# Patient Record
Sex: FEMALE | NOT HISPANIC OR LATINO
[De-identification: names, ages, dates, MRNs, and addresses within clinical notes are randomized per-mention and may not be internally consistent; named-entity substitution may affect disease eponyms.]

---

## 2019-05-29 PROBLEM — Z00.00 ENCOUNTER FOR PREVENTIVE HEALTH EXAMINATION: Status: ACTIVE | Noted: 2019-05-29

## 2019-07-09 ENCOUNTER — APPOINTMENT (OUTPATIENT)
Dept: OTOLARYNGOLOGY | Facility: CLINIC | Age: 79
End: 2019-07-09
Payer: MEDICARE

## 2019-07-09 VITALS — WEIGHT: 131 LBS | HEIGHT: 64 IN | BODY MASS INDEX: 22.36 KG/M2

## 2019-07-09 DIAGNOSIS — Z78.9 OTHER SPECIFIED HEALTH STATUS: ICD-10-CM

## 2019-07-09 DIAGNOSIS — G40.909 EPILEPSY, UNSPECIFIED, NOT INTRACTABLE, W/OUT STATUS EPILEPTICUS: ICD-10-CM

## 2019-07-09 DIAGNOSIS — Z86.79 PERSONAL HISTORY OF OTHER DISEASES OF THE CIRCULATORY SYSTEM: ICD-10-CM

## 2019-07-09 PROCEDURE — 69210 REMOVE IMPACTED EAR WAX UNI: CPT

## 2019-07-09 PROCEDURE — 99213 OFFICE O/P EST LOW 20 MIN: CPT | Mod: 25

## 2019-07-09 PROCEDURE — 31231 NASAL ENDOSCOPY DX: CPT

## 2019-07-09 RX ORDER — MULTIVITAMIN/IRON/FOLIC ACID 18MG-0.4MG
TABLET ORAL
Refills: 0 | Status: ACTIVE | COMMUNITY

## 2019-07-09 RX ORDER — AMLODIPINE BESYLATE 10 MG/1
10 TABLET ORAL
Refills: 0 | Status: ACTIVE | COMMUNITY

## 2019-07-09 RX ORDER — ASPIRIN 81 MG
81 TABLET, DELAYED RELEASE (ENTERIC COATED) ORAL
Refills: 0 | Status: ACTIVE | COMMUNITY

## 2019-07-09 RX ORDER — LOSARTAN POTASSIUM 100 MG/1
100 TABLET, FILM COATED ORAL
Refills: 0 | Status: ACTIVE | COMMUNITY

## 2019-07-09 RX ORDER — CHLORTHALIDONE 25 MG/1
25 TABLET ORAL
Refills: 0 | Status: ACTIVE | COMMUNITY

## 2019-07-09 RX ORDER — LEVOCETIRIZINE DIHYDROCHLORIDE 5 MG/1
5 TABLET ORAL
Refills: 0 | Status: ACTIVE | COMMUNITY

## 2019-07-09 RX ORDER — CARBAMAZEPINE 200 MG/1
200 CAPSULE, EXTENDED RELEASE ORAL
Refills: 0 | Status: ACTIVE | COMMUNITY

## 2019-07-09 NOTE — CONSULT LETTER
[Dear  ___] : Dear  [unfilled], [Courtesy Letter:] : I had the pleasure of seeing your patient, [unfilled], in my office today. [Please see my note below.] : Please see my note below. [Consult Closing:] : Thank you very much for allowing me to participate in the care of this patient.  If you have any questions, please do not hesitate to contact me. [Sincerely,] : Sincerely, [FreeTextEntry3] : Simran Shaver MD\par

## 2019-07-09 NOTE — HISTORY OF PRESENT ILLNESS
[de-identified] : DAVIE HUNTLEY is a 79 year patient with a history of MR, hypertension and epilepsy here for follow for chronic rhinosinusitis and nasal polyps.  She has been doing well by report. Her aide does not report any sinus infections or nasal symptoms. She also has hearing loss. Her last audiogram was within the past year by report.  We are waiting from her records from [place].

## 2019-07-09 NOTE — ASSESSMENT
[FreeTextEntry1] : She has a history of chronic rhinosinusitis and sphenoethmoidal recess polyps. She has minimal sinus symptoms by report. Her exam is stable.\par \par cerumen impaction was removed from the right ear\par \par Plan\par -nasal steroid spray and allergy medications prn nasal congestion\par -continue to monitor the sinuses. If she has any symptoms or there are concerns, repeat CT scan recommended\par -good aural hygiene\par -wax removal drops prn\par -annual audiogram\par -follow up in 6months. return earlier if any concerns.

## 2019-07-11 ENCOUNTER — APPOINTMENT (OUTPATIENT)
Dept: OTOLARYNGOLOGY | Facility: CLINIC | Age: 79
End: 2019-07-11

## 2020-01-07 ENCOUNTER — APPOINTMENT (OUTPATIENT)
Dept: OTOLARYNGOLOGY | Facility: CLINIC | Age: 80
End: 2020-01-07
Payer: MEDICARE

## 2020-01-07 PROCEDURE — 31231 NASAL ENDOSCOPY DX: CPT

## 2020-01-07 PROCEDURE — 99213 OFFICE O/P EST LOW 20 MIN: CPT | Mod: 25

## 2020-01-07 NOTE — ASSESSMENT
[FreeTextEntry1] : She has been doing well with no known sinus infections and no obvious sinus complaints. Her exam is stable.\par \par She had cerumen impaction. It was worse in the left side. The wax was removed\par \par Plan\par -reviewed with her aide\par -nasal steroid spray and allergy medications as needed for nasal congestion\par -she should return if she has any sinus complaints for re-evaluation and possible CT scan\par -good aural hygiene\par -wax removal drops prn\par -annual audiogram- I am referring her to [facility] for 2 person testing. \par -follow up in 6 months or earlier if needed.

## 2020-01-07 NOTE — CONSULT LETTER
[Dear  ___] : Dear  [unfilled], [Courtesy Letter:] : I had the pleasure of seeing your patient, [unfilled], in my office today. [Please see my note below.] : Please see my note below. [Consult Closing:] : Thank you very much for allowing me to participate in the care of this patient.  If you have any questions, please do not hesitate to contact me. [FreeTextEntry3] : Simran Shaver MD\par  [Sincerely,] : Sincerely,

## 2020-01-07 NOTE — HISTORY OF PRESENT ILLNESS
[FreeTextEntry1] : \par 1/7/20- Adry Elliott is here for followup. She has been doing well. She's not had any sinus infections according to her aide. She has not complained of nasal congestion or sinus symptoms. She does have itchy ears. She has no otorrhea. [de-identified] : 7/9/19- DAVIE HUNTLEY is a 79 year patient with a history of MR, hypertension and epilepsy here for follow for chronic rhinosinusitis and nasal polyps.  She has been doing well by report. Her aide does not report any sinus infections or nasal symptoms. She also has hearing loss. Her last audiogram was within the past year by report.  We are waiting from her records from [place]. \par

## 2020-01-09 ENCOUNTER — APPOINTMENT (OUTPATIENT)
Dept: OTOLARYNGOLOGY | Facility: CLINIC | Age: 80
End: 2020-01-09
Payer: MEDICARE

## 2020-01-09 PROCEDURE — 92555 SPEECH THRESHOLD AUDIOMETRY: CPT

## 2020-01-09 PROCEDURE — 92552 PURE TONE AUDIOMETRY AIR: CPT

## 2020-01-09 PROCEDURE — 92567 TYMPANOMETRY: CPT

## 2020-01-16 ENCOUNTER — APPOINTMENT (OUTPATIENT)
Dept: OTOLARYNGOLOGY | Facility: CLINIC | Age: 80
End: 2020-01-16
Payer: MEDICARE

## 2020-01-16 PROCEDURE — 92552 PURE TONE AUDIOMETRY AIR: CPT | Mod: 52

## 2020-01-16 PROCEDURE — 92556 SPEECH AUDIOMETRY COMPLETE: CPT

## 2020-02-20 ENCOUNTER — APPOINTMENT (OUTPATIENT)
Dept: OTOLARYNGOLOGY | Facility: CLINIC | Age: 80
End: 2020-02-20
Payer: MEDICARE

## 2020-02-20 VITALS — BODY MASS INDEX: 22.36 KG/M2 | WEIGHT: 131 LBS | HEIGHT: 64 IN

## 2020-02-20 PROCEDURE — 99213 OFFICE O/P EST LOW 20 MIN: CPT

## 2020-02-20 NOTE — ASSESSMENT
[FreeTextEntry1] : She has bilateral sensorineural hearing loss.  I reviewed her audiogram.\par \par Plan\par -reviewed with her aide\par -good aural hygiene\par -wax removal drops prn\par -annual audiogram-\par -they may consider HAE if she is having difficulty hearing.\par -follow up in one year if earlier if needed.

## 2020-02-20 NOTE — CONSULT LETTER
[Courtesy Letter:] : I had the pleasure of seeing your patient, [unfilled], in my office today. [Dear  ___] : Dear  [unfilled], [Please see my note below.] : Please see my note below. [Consult Closing:] : Thank you very much for allowing me to participate in the care of this patient.  If you have any questions, please do not hesitate to contact me. [Sincerely,] : Sincerely, [FreeTextEntry3] : Simran Shaver MD\par

## 2020-02-20 NOTE — HISTORY OF PRESENT ILLNESS
[de-identified] : 7/9/19- DAVIE HUNTLEY is a 79 year patient with a history of MR, hypertension and epilepsy here for follow for chronic rhinosinusitis and nasal polyps.  She has been doing well by report. Her aide does not report any sinus infections or nasal symptoms. She also has hearing loss. Her last audiogram was within the past year by report.  We are waiting from her records from [place]. \par  [FreeTextEntry1] : \par 1/7/20- Davie Elliott is here for followup. She has been doing well. She's not had any sinus infections according to her aide. She has not complained of nasal congestion or sinus symptoms. She does have itchy ears. She has no otorrhea.\par \par 2/20/20- DAVIE HUNTLEY Is here to review her audiogram. She has no no no complaints. She was seen last month for evaluation. Audiogram does show a bilateral sensorineural hearing loss.

## 2020-07-07 ENCOUNTER — APPOINTMENT (OUTPATIENT)
Dept: OTOLARYNGOLOGY | Facility: CLINIC | Age: 80
End: 2020-07-07

## 2020-07-17 ENCOUNTER — APPOINTMENT (OUTPATIENT)
Dept: OTOLARYNGOLOGY | Facility: CLINIC | Age: 80
End: 2020-07-17
Payer: MEDICARE

## 2020-07-17 PROCEDURE — 69210 REMOVE IMPACTED EAR WAX UNI: CPT

## 2020-07-17 PROCEDURE — 99213 OFFICE O/P EST LOW 20 MIN: CPT | Mod: 25

## 2020-07-17 NOTE — ASSESSMENT
[FreeTextEntry1] : She has bilateral sensorineural hearing loss.  She had cerumen impaction removed from her right ear. The left ear was clear\par \par she has a history of chronic sinusitis and nasal polyps. She has no known symptoms today. \par \par Plan\par -good aural hygiene\par -wax removal drops prn\par -repeat audiogram in 6 months. \par -they may consider HAE if she is having difficulty hearing.\par -repeat NE at her next visit. \par -follow up in 6 months or earlier if needed.

## 2020-07-17 NOTE — HISTORY OF PRESENT ILLNESS
[de-identified] : 7/9/19- DAVIE HUNTLEY is a 79 year patient with a history of MR, hypertension and epilepsy here for follow for chronic rhinosinusitis and nasal polyps.  She has been doing well by report. Her aide does not report any sinus infections or nasal symptoms. She also has hearing loss. Her last audiogram was within the past year by report.  We are waiting from her records from [place]. \par  [FreeTextEntry1] : \par 1/7/20- Adry Elliott is here for followup. She has been doing well. She's not had any sinus infections according to her aide. She has not complained of nasal congestion or sinus symptoms. She does have itchy ears. She has no otorrhea.\par \par 2/20/20Mirna HUNTLEY Is here to review her audiogram. She has no no no complaints. She was seen last month for evaluation. Audiogram does show a bilateral sensorineural hearing loss.\par \par 7/17/20Mirna HUNTLEY is a 80 year patient With a history of hearing loss, recurrent cerumen impaction, and chronic sinusitis. She has been doing well since her last visit. She has no known symptoms. She was accompanied by her aide. All precautions and recommendations per Adriana were taken during the visit including the use of PPE.\par

## 2021-02-05 ENCOUNTER — APPOINTMENT (OUTPATIENT)
Dept: OTOLARYNGOLOGY | Facility: CLINIC | Age: 81
End: 2021-02-05
Payer: MEDICARE

## 2021-02-05 VITALS — WEIGHT: 131 LBS | TEMPERATURE: 97.2 F | BODY MASS INDEX: 22.36 KG/M2 | HEIGHT: 64 IN

## 2021-02-05 PROCEDURE — 31231 NASAL ENDOSCOPY DX: CPT | Mod: 52

## 2021-02-05 PROCEDURE — 99213 OFFICE O/P EST LOW 20 MIN: CPT | Mod: 25

## 2021-02-05 RX ORDER — KETOCONAZOLE 20 MG/G
2 CREAM TOPICAL
Qty: 60 | Refills: 0 | Status: ACTIVE | COMMUNITY
Start: 2020-12-22

## 2021-02-05 RX ORDER — CLOTRIMAZOLE 10 MG/G
1 CREAM TOPICAL
Qty: 45 | Refills: 0 | Status: ACTIVE | COMMUNITY
Start: 2021-01-27

## 2021-02-05 RX ORDER — NYSTATIN 100000 [USP'U]/G
100000 POWDER TOPICAL
Qty: 60 | Refills: 0 | Status: ACTIVE | COMMUNITY
Start: 2020-11-26

## 2021-02-05 RX ORDER — POTASSIUM CHLORIDE 750 MG/1
10 TABLET, FILM COATED, EXTENDED RELEASE ORAL
Qty: 30 | Refills: 0 | Status: ACTIVE | COMMUNITY
Start: 2021-01-13

## 2021-02-05 RX ORDER — LEVETIRACETAM 100 MG/ML
100 SOLUTION ORAL
Qty: 946 | Refills: 0 | Status: ACTIVE | COMMUNITY
Start: 2020-08-03

## 2021-02-05 RX ORDER — TRIAMCINOLONE ACETONIDE 1 MG/G
0.1 OINTMENT TOPICAL
Qty: 15 | Refills: 0 | Status: ACTIVE | COMMUNITY
Start: 2021-01-13

## 2021-02-05 RX ORDER — CARBAMAZEPINE 200 MG/1
200 TABLET ORAL
Qty: 180 | Refills: 0 | Status: ACTIVE | COMMUNITY
Start: 2020-08-03

## 2021-02-05 NOTE — ASSESSMENT
[FreeTextEntry1] : She has bilateral sensorineural hearing loss.  She had no cerumen impaction on exam today\par \par she has a history of chronic sinusitis and nasal polyps. She has not had a sinus infection or sinus complaints. \par \par Plan\par -good aural hygiene\par -wax removal drops prn\par -annual audiogram. I will send her to [facility] for two-person testing. \par -consider HAE if she is having difficulty hearing.\par -we will continue to monitor her nasal exam. \par -follow up in one year if she is doing well. Return earlier if any problems or concerns. She should return urgently if there is concern for a sinus infection.

## 2021-02-05 NOTE — HISTORY OF PRESENT ILLNESS
[de-identified] : DAVIE HUNTLEY is a 80 year patient With a history of MR, hypertension, epilepsy, hearing loss, recurrent cerumen impaction, chronic rhinosinusitis, and nasal polyps. She is here to have her ears checked. She has no known ear, nose or throat complaints. She has not had a sinus infection. She was accompanied by her aide.

## 2022-02-17 ENCOUNTER — APPOINTMENT (OUTPATIENT)
Dept: OTOLARYNGOLOGY | Facility: CLINIC | Age: 82
End: 2022-02-17
Payer: MEDICARE

## 2022-02-17 DIAGNOSIS — J33.9 NASAL POLYP, UNSPECIFIED: ICD-10-CM

## 2022-02-17 PROCEDURE — 99213 OFFICE O/P EST LOW 20 MIN: CPT

## 2022-02-17 NOTE — HISTORY OF PRESENT ILLNESS
[de-identified] : DAVIE HUNTLEY is a 81 year patient with a history of MR, hypertension, epilepsy, hearing loss, recurrent cerumen impaction, chronic rhinosinusitis, and nasal polyps. She is here for her annual exam. She is accompanied by her aide. She has no known ear, nose  or throat complaints \par \par She had a CT scan of the head in 2019. I was able to review the films and the scan- no significant sinus disease seen.

## 2022-02-17 NOTE — ASSESSMENT
[FreeTextEntry1] : She has bilateral sensorineural hearing loss.  She had no cerumen impaction on exam today\par \par she has a history of chronic sinusitis and nasal polyps. She has not had a sinus infection or sinus complaints. CT scan 2019- no significant sinus disease \par \par Plan\par -good aural hygiene\par -wax removal drops prn\par -annual audiogram. I recommend testing at [facility] for two-person testing. \par -consider HAE if she is having difficulty hearing.\par -we will continue to monitor her nasal exam as needed. Since her last CT scan showed no sinus disease, we can hold off on repeating the nasal endoscopy.  \par -follow up in one year if she is doing well (rather than 6 months since the scan did not show sinus inflammation)\par -Return earlier if any problems or concerns.

## 2022-05-05 ENCOUNTER — APPOINTMENT (OUTPATIENT)
Dept: OTOLARYNGOLOGY | Facility: CLINIC | Age: 82
End: 2022-05-05

## 2022-08-19 ENCOUNTER — APPOINTMENT (OUTPATIENT)
Dept: OTOLARYNGOLOGY | Facility: CLINIC | Age: 82
End: 2022-08-19

## 2022-08-19 VITALS — TEMPERATURE: 97.1 F | WEIGHT: 131 LBS | HEIGHT: 64 IN | BODY MASS INDEX: 22.36 KG/M2

## 2022-08-19 DIAGNOSIS — H61.21 IMPACTED CERUMEN, RIGHT EAR: ICD-10-CM

## 2022-08-19 DIAGNOSIS — J32.9 CHRONIC SINUSITIS, UNSPECIFIED: ICD-10-CM

## 2022-08-19 PROCEDURE — 69210 REMOVE IMPACTED EAR WAX UNI: CPT

## 2022-08-19 PROCEDURE — 99213 OFFICE O/P EST LOW 20 MIN: CPT | Mod: 25

## 2022-08-19 RX ORDER — FLUTICASONE PROPIONATE 50 UG/1
50 SPRAY, METERED NASAL DAILY
Qty: 1 | Refills: 4 | Status: ACTIVE | COMMUNITY
Start: 2022-08-19 | End: 1900-01-01

## 2022-08-19 NOTE — ASSESSMENT
[FreeTextEntry1] : She has bilateral sensorineural hearing loss.  Cerumen was removed from the right ear\par \par she has a history of chronic sinusitis and nasal polyps.  She has no known nasal or sinus complaints\par \par Plan\par -good aural hygiene\par -wax removal drops prn\par -I recommend 2 person testing for her audiogram to assess her hearing.  It is possible this could be done by audiology in the [address] [place] [address] ([facility]) where they have tested her in the past.  Otherwise, consider evaluation at the [facility] for hearing and communication or [place] [facility] where they will likely have two-prerson testing\par -consider hearing aid evaluation if she is having difficulty hearing. \par -We will evaluate her sinuses as needed since she has no symptoms\par -flonase nasal spray as needed\par -follow up in one year if she is doing well\par -Return earlier if any problems or concerns.

## 2022-08-19 NOTE — HISTORY OF PRESENT ILLNESS
[de-identified] : DAVIE HUNTLEY is a 82 year old patient with a history of MR, hypertension, epilepsy, hearing loss, recurrent cerumen impaction, chronic rhinosinusitis, nasal polyps.  She is here to check her ears.  She has no known otalgia or otorrhea.  She also does not have any known sinus or nasal complaints.\par \par CT scan of the head from 2019–no significant sinus disease seen.  No obvious polyps

## 2022-08-30 ENCOUNTER — APPOINTMENT (OUTPATIENT)
Dept: OTOLARYNGOLOGY | Facility: CLINIC | Age: 82
End: 2022-08-30

## 2022-08-30 PROCEDURE — 92567 TYMPANOMETRY: CPT

## 2022-08-30 PROCEDURE — 92588 EVOKED AUDITORY TST COMPLETE: CPT

## 2022-08-30 PROCEDURE — 92555 SPEECH THRESHOLD AUDIOMETRY: CPT

## 2023-07-21 ENCOUNTER — APPOINTMENT (OUTPATIENT)
Dept: OTOLARYNGOLOGY | Facility: CLINIC | Age: 83
End: 2023-07-21
Payer: MEDICARE

## 2023-07-21 DIAGNOSIS — Z78.9 OTHER SPECIFIED HEALTH STATUS: ICD-10-CM

## 2023-07-21 PROCEDURE — 99213 OFFICE O/P EST LOW 20 MIN: CPT

## 2023-07-21 RX ORDER — ASPIRIN 81 MG
6.5 TABLET, DELAYED RELEASE (ENTERIC COATED) ORAL
Qty: 1 | Refills: 0 | Status: ACTIVE | COMMUNITY
Start: 2023-07-21 | End: 1900-01-01

## 2023-07-21 NOTE — HISTORY OF PRESENT ILLNESS
[de-identified] : DAVIE HUNTLEY is a 83 year old patient with a history of MR, hypertension, epilepsy, hearing loss, chronic rhinosinusitis, nasal polyps, and recurrent cerumen impaction.  She is here to check her ears.  She does not have any no ear pain, otorrhea, nasal or sinus symptoms.  She was accompanied by her aide.  Audiogram last year showed abnormal OAE's.\par \par CT scan of the head from 2019–no significant sinus disease seen. No obvious polyps

## 2023-07-21 NOTE — ASSESSMENT
[FreeTextEntry1] : She has bilateral sensorineural hearing loss.  She had no significant cerumen impaction\par \par Plan\par -good aural hygiene\par -wax removal drops prn\par -Annual audiogram.  To person testing can be tried again.  This can be done at the [address] [place] [address] office at [facility], [place] [facility], or [facility] for hearing and communication.  Consider sedated ABR if unable to get testing\par -Consider hearing aid evaluation if she is having difficulty hearing\par -She has not had any known sinus symptoms this past year.  Sinuses were clear on CT scan 2019.  We will reevaluate the sinuses if she has any symptoms\par -Follow-up in 1 year or earlier if needed to check lorne ears

## 2024-06-25 ENCOUNTER — APPOINTMENT (OUTPATIENT)
Dept: OTOLARYNGOLOGY | Facility: CLINIC | Age: 84
End: 2024-06-25
Payer: MEDICARE

## 2024-06-25 DIAGNOSIS — H93.293 OTHER ABNORMAL AUDITORY PERCEPTIONS, BILATERAL: ICD-10-CM

## 2024-06-25 DIAGNOSIS — H61.22 IMPACTED CERUMEN, LEFT EAR: ICD-10-CM

## 2024-06-25 DIAGNOSIS — H90.3 SENSORINEURAL HEARING LOSS, BILATERAL: ICD-10-CM

## 2024-06-25 PROCEDURE — 99213 OFFICE O/P EST LOW 20 MIN: CPT | Mod: 25

## 2024-06-25 PROCEDURE — 69210 REMOVE IMPACTED EAR WAX UNI: CPT

## 2024-06-28 PROBLEM — H93.293 ABNORMAL AUDITORY PERCEPTION OF BOTH EARS: Status: ACTIVE | Noted: 2024-06-28

## 2024-07-02 ENCOUNTER — APPOINTMENT (OUTPATIENT)
Dept: OTOLARYNGOLOGY | Facility: CLINIC | Age: 84
End: 2024-07-02

## 2025-06-24 ENCOUNTER — APPOINTMENT (OUTPATIENT)
Dept: OTOLARYNGOLOGY | Facility: CLINIC | Age: 85
End: 2025-06-24
Payer: MEDICARE

## 2025-06-24 PROCEDURE — G0268 REMOVAL OF IMPACTED WAX MD: CPT

## 2025-06-24 PROCEDURE — 92550 TYMPANOMETRY & REFLEX THRESH: CPT

## 2025-06-24 PROCEDURE — 99213 OFFICE O/P EST LOW 20 MIN: CPT | Mod: 25
